# Patient Record
Sex: MALE | Race: BLACK OR AFRICAN AMERICAN | URBAN - METROPOLITAN AREA
[De-identification: names, ages, dates, MRNs, and addresses within clinical notes are randomized per-mention and may not be internally consistent; named-entity substitution may affect disease eponyms.]

---

## 2022-12-27 ENCOUNTER — LAB OUTSIDE AN ENCOUNTER (OUTPATIENT)
Dept: URBAN - METROPOLITAN AREA CLINIC 105 | Facility: CLINIC | Age: 32
End: 2022-12-27

## 2022-12-27 ENCOUNTER — OFFICE VISIT (OUTPATIENT)
Dept: URBAN - METROPOLITAN AREA CLINIC 105 | Facility: CLINIC | Age: 32
End: 2022-12-27
Payer: COMMERCIAL

## 2022-12-27 ENCOUNTER — WEB ENCOUNTER (OUTPATIENT)
Dept: URBAN - METROPOLITAN AREA CLINIC 105 | Facility: CLINIC | Age: 32
End: 2022-12-27

## 2022-12-27 VITALS
DIASTOLIC BLOOD PRESSURE: 82 MMHG | BODY MASS INDEX: 27.85 KG/M2 | SYSTOLIC BLOOD PRESSURE: 124 MMHG | TEMPERATURE: 97.4 F | HEIGHT: 74 IN | WEIGHT: 217 LBS | HEART RATE: 77 BPM

## 2022-12-27 DIAGNOSIS — K21.9 ACID REFLUX: ICD-10-CM

## 2022-12-27 DIAGNOSIS — R10.11 RUQ ABDOMINAL PAIN: ICD-10-CM

## 2022-12-27 PROCEDURE — 99204 OFFICE O/P NEW MOD 45 MIN: CPT | Performed by: INTERNAL MEDICINE

## 2022-12-27 NOTE — HPI-TODAY'S VISIT:
Today, the patient reports onset of RUQ cramping in the past week. His pain is more pronounced in the morning, when he breaths in and out, coughs, stretches, or stands up - feels a tightness. His pain can also radiate to his lower abdomen or center. He went to an urgent care clinic last week and was rx'd famotidine which does not provide a benefit. He cannot pinpoint a pattern to his discomfort. He felt less pain today, but returned when he drank coffee. Prior to having this pain, he would exercise (weight training and/or cardio) 4-5x/week. He has heartburn 1x/few weeks that is relieved with belching.  No other PMH No PSH FHx: Mother - liver disease from hep C

## 2022-12-28 LAB
A/G RATIO: 1.6
ABSOLUTE BASOPHILS: 90
ABSOLUTE EOSINOPHILS: 70
ABSOLUTE LYMPHOCYTES: 2710
ABSOLUTE MONOCYTES: 640
ABSOLUTE NEUTROPHILS: 6490
ALBUMIN: 4.4
ALKALINE PHOSPHATASE: 52
ALT (SGPT): 18
AST (SGOT): 19
BASOPHILS: 0.9
BILIRUBIN, TOTAL: 0.6
BUN/CREATININE RATIO: 11
BUN: 14
CALCIUM: 9.3
CARBON DIOXIDE, TOTAL: 27
CHLORIDE: 101
CREATININE: 1.31
EGFR: 74
EOSINOPHILS: 0.7
GLOBULIN, TOTAL: 2.8
GLUCOSE: 88
HEMATOCRIT: 42.2
HEMOGLOBIN: 14.1
LYMPHOCYTES: 27.1
MCH: 27.3
MCHC: 33.4
MCV: 81.8
MONOCYTES: 6.4
MPV: 10.3
NEUTROPHILS: 64.9
PLATELET COUNT: 289
POTASSIUM: 4.2
PROTEIN, TOTAL: 7.2
RDW: 13.3
RED BLOOD CELL COUNT: 5.16
SODIUM: 139
WHITE BLOOD CELL COUNT: 10

## 2022-12-30 ENCOUNTER — OFFICE VISIT (OUTPATIENT)
Dept: URBAN - METROPOLITAN AREA CLINIC 91 | Facility: CLINIC | Age: 32
End: 2022-12-30
Payer: COMMERCIAL

## 2022-12-30 DIAGNOSIS — R10.11 RUQ ABDOMINAL PAIN: ICD-10-CM

## 2022-12-30 PROCEDURE — 76705 ECHO EXAM OF ABDOMEN: CPT | Performed by: INTERNAL MEDICINE

## 2023-01-10 ENCOUNTER — WEB ENCOUNTER (OUTPATIENT)
Dept: URBAN - METROPOLITAN AREA CLINIC 105 | Facility: CLINIC | Age: 33
End: 2023-01-10

## 2023-01-10 ENCOUNTER — DASHBOARD ENCOUNTERS (OUTPATIENT)
Age: 33
End: 2023-01-10

## 2023-01-10 ENCOUNTER — OFFICE VISIT (OUTPATIENT)
Dept: URBAN - METROPOLITAN AREA CLINIC 105 | Facility: CLINIC | Age: 33
End: 2023-01-10
Payer: COMMERCIAL

## 2023-01-10 VITALS
TEMPERATURE: 96.6 F | HEIGHT: 74 IN | WEIGHT: 218.2 LBS | HEART RATE: 71 BPM | BODY MASS INDEX: 28 KG/M2 | SYSTOLIC BLOOD PRESSURE: 120 MMHG | DIASTOLIC BLOOD PRESSURE: 75 MMHG

## 2023-01-10 DIAGNOSIS — R10.11 RUQ ABDOMINAL PAIN: ICD-10-CM

## 2023-01-10 DIAGNOSIS — K21.9 GASTROESOPHAGEAL REFLUX DISEASE, UNSPECIFIED WHETHER ESOPHAGITIS PRESENT: ICD-10-CM

## 2023-01-10 PROBLEM — 235595009: Status: ACTIVE | Noted: 2022-12-27

## 2023-01-10 PROCEDURE — 99213 OFFICE O/P EST LOW 20 MIN: CPT | Performed by: INTERNAL MEDICINE

## 2023-01-10 NOTE — HPI-TODAY'S VISIT:
On 12/27/22, the patient reported onset of RUQ cramping in the past week. His pain was more pronounced in the morning, when he breathed in and out, coughed, stretched, or stood up - felt a tightness. His pain could also radiate to his lower abdomen or center. He went to an urgent care clinic last week and was rx'd famotidine which did not provide a benefit. He cannot pinpoint a pattern to his discomfort. He felt less pain today, but returned when he drank coffee. Prior to having this pain, he would exercise (weight training and/or cardio) 4-5x/week. He had heartburn 1x/few weeks that was relieved with belching.  No other PMH No PSH FHx: Mother - liver disease from hep C  HPI: Today, he says he tried Tylenol/ibuprofen once for his pain which maybe provided a benefit. His pain dissipated a few days after his ultrasound then returned for one day last week - no pain since.  Labs 12/27/22 - CMP normal except creatinine 1.31. CBC normal.